# Patient Record
Sex: FEMALE | URBAN - METROPOLITAN AREA
[De-identification: names, ages, dates, MRNs, and addresses within clinical notes are randomized per-mention and may not be internally consistent; named-entity substitution may affect disease eponyms.]

---

## 2019-11-11 ENCOUNTER — TELEPHONE (OUTPATIENT)
Dept: OTOLARYNGOLOGY | Facility: CLINIC | Age: 59
End: 2019-11-11

## 2019-11-11 NOTE — TELEPHONE ENCOUNTER
[facility] Call Center    Phone Message    May a detailed message be left on voicemail: yes    Reason for Call: Other: Patient with a diagnosis of Trigeminal Neuralgia would like to make an ent appointment//Writer could not find the diagnosis in the protocols but the [facility] page says we see for this diagnosis in ent//Could someone please follow up with the patient regarding this//Thanks.     Action Taken: Message routed to:  [facility] ([facility]): ent

## 2020-02-13 NOTE — TELEPHONE ENCOUNTER
Patient reports getting [facility] and that she has already been seen for her request.    Ita Block